# Patient Record
Sex: MALE | Race: OTHER | HISPANIC OR LATINO | URBAN - METROPOLITAN AREA
[De-identification: names, ages, dates, MRNs, and addresses within clinical notes are randomized per-mention and may not be internally consistent; named-entity substitution may affect disease eponyms.]

---

## 2017-02-21 ENCOUNTER — INPATIENT (INPATIENT)
Facility: HOSPITAL | Age: 67
LOS: 2 days | Discharge: HOME | DRG: 195 | End: 2017-02-24
Attending: INTERNAL MEDICINE | Admitting: HOSPITALIST
Payer: COMMERCIAL

## 2017-02-21 VITALS
OXYGEN SATURATION: 93 % | DIASTOLIC BLOOD PRESSURE: 79 MMHG | HEART RATE: 80 BPM | WEIGHT: 175.93 LBS | TEMPERATURE: 99 F | SYSTOLIC BLOOD PRESSURE: 160 MMHG | RESPIRATION RATE: 20 BRPM

## 2017-02-21 LAB
ALBUMIN SERPL ELPH-MCNC: 4.3 G/DL — SIGNIFICANT CHANGE UP (ref 3.3–5.2)
ALP SERPL-CCNC: 105 U/L — SIGNIFICANT CHANGE UP (ref 40–120)
ALT FLD-CCNC: 23 U/L — SIGNIFICANT CHANGE UP
ANION GAP SERPL CALC-SCNC: 12 MMOL/L — SIGNIFICANT CHANGE UP (ref 5–17)
AST SERPL-CCNC: 29 U/L — SIGNIFICANT CHANGE UP
BILIRUB SERPL-MCNC: 0.2 MG/DL — LOW (ref 0.4–2)
BUN SERPL-MCNC: 8 MG/DL — SIGNIFICANT CHANGE UP (ref 8–20)
CALCIUM SERPL-MCNC: 9.2 MG/DL — SIGNIFICANT CHANGE UP (ref 8.6–10.2)
CHLORIDE SERPL-SCNC: 95 MMOL/L — LOW (ref 98–107)
CO2 SERPL-SCNC: 29 MMOL/L — SIGNIFICANT CHANGE UP (ref 22–29)
CREAT SERPL-MCNC: 0.75 MG/DL — SIGNIFICANT CHANGE UP (ref 0.5–1.3)
GLUCOSE SERPL-MCNC: 154 MG/DL — HIGH (ref 70–115)
HCT VFR BLD CALC: 44.2 % — SIGNIFICANT CHANGE UP (ref 42–52)
HGB BLD-MCNC: 15.1 G/DL — SIGNIFICANT CHANGE UP (ref 14–18)
LYMPHOCYTES # BLD AUTO: 10 % — LOW (ref 20–55)
MCHC RBC-ENTMCNC: 30.7 PG — SIGNIFICANT CHANGE UP (ref 27–31)
MCHC RBC-ENTMCNC: 34.2 G/DL — SIGNIFICANT CHANGE UP (ref 32–36)
MCV RBC AUTO: 89.8 FL — SIGNIFICANT CHANGE UP (ref 80–94)
METAMYELOCYTES # FLD: 1 % — HIGH (ref 0–0)
MONOCYTES NFR BLD AUTO: 6 % — SIGNIFICANT CHANGE UP (ref 3–10)
NEUTROPHILS NFR BLD AUTO: 81 % — HIGH (ref 37–73)
NEUTS BAND # BLD: 1 % — SIGNIFICANT CHANGE UP (ref 0–8)
PLAT MORPH BLD: NORMAL — SIGNIFICANT CHANGE UP
PLATELET # BLD AUTO: 224 K/UL — SIGNIFICANT CHANGE UP (ref 150–400)
POTASSIUM SERPL-MCNC: 5.5 MMOL/L — HIGH (ref 3.5–5.3)
POTASSIUM SERPL-SCNC: 5.5 MMOL/L — HIGH (ref 3.5–5.3)
PROT SERPL-MCNC: 8.4 G/DL — SIGNIFICANT CHANGE UP (ref 6.6–8.7)
RBC # BLD: 4.92 M/UL — SIGNIFICANT CHANGE UP (ref 4.6–6.2)
RBC # FLD: 12.7 % — SIGNIFICANT CHANGE UP (ref 11–15.6)
RBC BLD AUTO: NORMAL — SIGNIFICANT CHANGE UP
SODIUM SERPL-SCNC: 136 MMOL/L — SIGNIFICANT CHANGE UP (ref 135–145)
VARIANT LYMPHS # BLD: 1 % — SIGNIFICANT CHANGE UP (ref 0–6)
WBC # BLD: 11.8 K/UL — HIGH (ref 4.8–10.8)
WBC # FLD AUTO: 11.8 K/UL — HIGH (ref 4.8–10.8)

## 2017-02-21 PROCEDURE — 99285 EMERGENCY DEPT VISIT HI MDM: CPT

## 2017-02-21 PROCEDURE — 71250 CT THORAX DX C-: CPT | Mod: 26

## 2017-02-21 RX ORDER — CEFTRIAXONE 500 MG/1
1 INJECTION, POWDER, FOR SOLUTION INTRAMUSCULAR; INTRAVENOUS EVERY 24 HOURS
Qty: 0 | Refills: 0 | Status: DISCONTINUED | OUTPATIENT
Start: 2017-02-21 | End: 2017-02-24

## 2017-02-21 RX ORDER — AZITHROMYCIN 500 MG/1
500 TABLET, FILM COATED ORAL ONCE
Qty: 0 | Refills: 0 | Status: COMPLETED | OUTPATIENT
Start: 2017-02-21 | End: 2017-02-21

## 2017-02-21 RX ORDER — IPRATROPIUM/ALBUTEROL SULFATE 18-103MCG
3 AEROSOL WITH ADAPTER (GRAM) INHALATION ONCE
Qty: 0 | Refills: 0 | Status: COMPLETED | OUTPATIENT
Start: 2017-02-21 | End: 2017-02-21

## 2017-02-21 RX ADMIN — Medication 125 MILLIGRAM(S): at 20:25

## 2017-02-21 RX ADMIN — Medication 3 MILLILITER(S): at 20:25

## 2017-02-21 RX ADMIN — CEFTRIAXONE 100 GRAM(S): 500 INJECTION, POWDER, FOR SOLUTION INTRAMUSCULAR; INTRAVENOUS at 20:24

## 2017-02-21 RX ADMIN — AZITHROMYCIN 500 MILLIGRAM(S): 500 TABLET, FILM COATED ORAL at 20:25

## 2017-02-21 NOTE — ED STATDOCS - PROGRESS NOTE DETAILS
PA NOTE: Pt seen by intake physician and orders/plan reviewed. PT is a 67yo male with DM on metformin presenting to ED with complaints of  PE: GEN: Awake, alert,  NAD,  EYES: PERRL CARDIAC: Reg rate and rhythm, S1,S2, RRR  RESP: No distress noted. Lungs CTA bilaterally no wheeze, ronchi, rales. ABD: soft, supple, non-tender, no guarding. . NEURO: AOx3, no focal deficits   PLAN: PA NOTE: Pt seen by intake physician and orders/plan reviewed. PT is a 67yo male with DM on metformin presenting to ED with complaints of cough and congestion x 3 days. pt reports non-productive cough   PE: GEN: Awake, alert,  NAD,  EYES: PERRL CARDIAC: Reg rate and rhythm, S1,S2, RRR  RESP: No distress noted. Lungs CTA bilaterally no wheeze, ronchi, rales. ABD: soft, supple, non-tender, no guarding. . NEURO: AOx3, no focal deficits   PLAN: PA NOTE: Pt seen by intake physician and orders/plan reviewed. PT is a 65yo male with DM on metformin presenting to ED with complaints of cough and congestion x 3 days. pt reports non-productive cough and nasal congestion. pt took Robitussin for symptoms. pt was seen at [facility] today and CXR shows PNA. Pt sent to ED for further evaluation due to low O2 sat. Pt endorses orthopnea without SOB when ambulating. pt is able to walk without trouble. Pt smokes [id number] fever, chills, rash, CP, N/V/D, abd pain. NKDA  PE: GEN: Awake, alert,  NAD, SKIN: warm, dry. consistent color with race. No cyanosis or nail clubbing.  CARDIAC: Reg rate and rhythm, S1,S2, RRR  RESP: No distress noted. Lungs decreased BS BL. + light rales heart. ABD: soft, supple, non-tender, no guarding. NEURO: AOx3, no focal deficits   PLAN:pt is a 65yo male with PNA and low O2 SAT at 93%RA. Labs pending. will re-evaluate. Pt reports improvement of symptoms. CT and labs reviewed. O2 sat 91%. Will admit to medicine. pt verbalized understanding and agreement with plan and dx. will admit.

## 2017-02-21 NOTE — ED STATDOCS - OBJECTIVE STATEMENT
65 y/o M pt w/ PMHx of DM presents to the ED c/o cough and nasal congestion x3 days. Pt's daughter states that the pt went to [facility] and was found to be hypoxic and had an X-ray showing pneumonia. Pt denies feeling short of breath. Pt's daughter notes the pt wakes up at night gasping for air. Pt denies throat pain, vomiting, fever, diarrhea, or any other complaints. NKDA. As per [facility] paper work, pt has a patchy infiltrate in his L lower lobe.

## 2017-02-21 NOTE — ED STATDOCS - MEDICAL DECISION MAKING DETAILS
67 y/o M pt w/ labs, abx, nebs treatment, steroids, and reassess. Possible admit if O2 sat does not improve.

## 2017-02-22 DIAGNOSIS — J18.9 PNEUMONIA, UNSPECIFIED ORGANISM: ICD-10-CM

## 2017-02-22 LAB
ANION GAP SERPL CALC-SCNC: 12 MMOL/L — SIGNIFICANT CHANGE UP (ref 5–17)
APPEARANCE UR: CLEAR — SIGNIFICANT CHANGE UP
BILIRUB UR-MCNC: NEGATIVE — SIGNIFICANT CHANGE UP
BUN SERPL-MCNC: 10 MG/DL — SIGNIFICANT CHANGE UP (ref 8–20)
CALCIUM SERPL-MCNC: 9.2 MG/DL — SIGNIFICANT CHANGE UP (ref 8.6–10.2)
CHLORIDE SERPL-SCNC: 97 MMOL/L — LOW (ref 98–107)
CO2 SERPL-SCNC: 24 MMOL/L — SIGNIFICANT CHANGE UP (ref 22–29)
COLOR SPEC: YELLOW — SIGNIFICANT CHANGE UP
CREAT SERPL-MCNC: 0.71 MG/DL — SIGNIFICANT CHANGE UP (ref 0.5–1.3)
DIFF PNL FLD: ABNORMAL
GLUCOSE SERPL-MCNC: 319 MG/DL — HIGH (ref 70–115)
GLUCOSE UR QL: 1000 MG/DL
HCT VFR BLD CALC: 39.1 % — LOW (ref 42–52)
HGB BLD-MCNC: 13.5 G/DL — LOW (ref 14–18)
KETONES UR-MCNC: ABNORMAL
LEUKOCYTE ESTERASE UR-ACNC: NEGATIVE — SIGNIFICANT CHANGE UP
LYMPHOCYTES # BLD AUTO: 0.8 K/UL — LOW (ref 1–4.8)
LYMPHOCYTES # BLD AUTO: 10 % — LOW (ref 20–55)
MAGNESIUM SERPL-MCNC: 2 MG/DL — SIGNIFICANT CHANGE UP (ref 1.8–2.5)
MCHC RBC-ENTMCNC: 30.5 PG — SIGNIFICANT CHANGE UP (ref 27–31)
MCHC RBC-ENTMCNC: 34.5 G/DL — SIGNIFICANT CHANGE UP (ref 32–36)
MCV RBC AUTO: 88.3 FL — SIGNIFICANT CHANGE UP (ref 80–94)
MONOCYTES NFR BLD AUTO: 1 % — LOW (ref 3–10)
NEUTROPHILS # BLD AUTO: 7.5 K/UL — SIGNIFICANT CHANGE UP (ref 1.8–8)
NEUTROPHILS NFR BLD AUTO: 87 % — HIGH (ref 37–73)
NEUTS BAND # BLD: 2 % — SIGNIFICANT CHANGE UP (ref 0–8)
NITRITE UR-MCNC: NEGATIVE — SIGNIFICANT CHANGE UP
PH UR: 7 — SIGNIFICANT CHANGE UP (ref 4.8–8)
PLAT MORPH BLD: NORMAL — SIGNIFICANT CHANGE UP
PLATELET # BLD AUTO: 215 K/UL — SIGNIFICANT CHANGE UP (ref 150–400)
POTASSIUM SERPL-MCNC: 4.5 MMOL/L — SIGNIFICANT CHANGE UP (ref 3.5–5.3)
POTASSIUM SERPL-SCNC: 4.5 MMOL/L — SIGNIFICANT CHANGE UP (ref 3.5–5.3)
PROT UR-MCNC: NEGATIVE MG/DL — SIGNIFICANT CHANGE UP
RAPID RVP RESULT: SIGNIFICANT CHANGE UP
RBC # BLD: 4.43 M/UL — LOW (ref 4.6–6.2)
RBC # FLD: 12.6 % — SIGNIFICANT CHANGE UP (ref 11–15.6)
RBC BLD AUTO: NORMAL — SIGNIFICANT CHANGE UP
SODIUM SERPL-SCNC: 133 MMOL/L — LOW (ref 135–145)
SP GR SPEC: 1.01 — SIGNIFICANT CHANGE UP (ref 1.01–1.02)
UROBILINOGEN FLD QL: NEGATIVE MG/DL — SIGNIFICANT CHANGE UP
WBC # BLD: 8.4 K/UL — SIGNIFICANT CHANGE UP (ref 4.8–10.8)
WBC # FLD AUTO: 8.4 K/UL — SIGNIFICANT CHANGE UP (ref 4.8–10.8)
WBC UR QL: SIGNIFICANT CHANGE UP

## 2017-02-22 PROCEDURE — 99222 1ST HOSP IP/OBS MODERATE 55: CPT

## 2017-02-22 RX ORDER — ACETAMINOPHEN 500 MG
650 TABLET ORAL EVERY 6 HOURS
Qty: 0 | Refills: 0 | Status: DISCONTINUED | OUTPATIENT
Start: 2017-02-22 | End: 2017-02-24

## 2017-02-22 RX ORDER — AZITHROMYCIN 500 MG/1
500 TABLET, FILM COATED ORAL EVERY 24 HOURS
Qty: 0 | Refills: 0 | Status: DISCONTINUED | OUTPATIENT
Start: 2017-02-22 | End: 2017-02-22

## 2017-02-22 RX ORDER — SODIUM CHLORIDE 9 MG/ML
1000 INJECTION INTRAMUSCULAR; INTRAVENOUS; SUBCUTANEOUS
Qty: 0 | Refills: 0 | Status: DISCONTINUED | OUTPATIENT
Start: 2017-02-22 | End: 2017-02-22

## 2017-02-22 RX ORDER — ENOXAPARIN SODIUM 100 MG/ML
40 INJECTION SUBCUTANEOUS DAILY
Qty: 0 | Refills: 0 | Status: DISCONTINUED | OUTPATIENT
Start: 2017-02-22 | End: 2017-02-24

## 2017-02-22 RX ORDER — DEXTROSE 50 % IN WATER 50 %
1 SYRINGE (ML) INTRAVENOUS ONCE
Qty: 0 | Refills: 0 | Status: DISCONTINUED | OUTPATIENT
Start: 2017-02-22 | End: 2017-02-24

## 2017-02-22 RX ORDER — INFLUENZA VIRUS VACCINE 15; 15; 15; 15 UG/.5ML; UG/.5ML; UG/.5ML; UG/.5ML
0.5 SUSPENSION INTRAMUSCULAR ONCE
Qty: 0 | Refills: 0 | Status: COMPLETED | OUTPATIENT
Start: 2017-02-22 | End: 2017-02-22

## 2017-02-22 RX ORDER — IPRATROPIUM/ALBUTEROL SULFATE 18-103MCG
3 AEROSOL WITH ADAPTER (GRAM) INHALATION EVERY 6 HOURS
Qty: 0 | Refills: 0 | Status: DISCONTINUED | OUTPATIENT
Start: 2017-02-22 | End: 2017-02-24

## 2017-02-22 RX ORDER — DEXTROSE 50 % IN WATER 50 %
12.5 SYRINGE (ML) INTRAVENOUS ONCE
Qty: 0 | Refills: 0 | Status: DISCONTINUED | OUTPATIENT
Start: 2017-02-22 | End: 2017-02-24

## 2017-02-22 RX ORDER — SODIUM CHLORIDE 9 MG/ML
1000 INJECTION, SOLUTION INTRAVENOUS
Qty: 0 | Refills: 0 | Status: DISCONTINUED | OUTPATIENT
Start: 2017-02-22 | End: 2017-02-24

## 2017-02-22 RX ORDER — METFORMIN HYDROCHLORIDE 850 MG/1
1 TABLET ORAL
Qty: 0 | Refills: 0 | COMMUNITY

## 2017-02-22 RX ORDER — LACTOBACILLUS ACIDOPHILUS 100MM CELL
1 CAPSULE ORAL
Qty: 0 | Refills: 0 | Status: DISCONTINUED | OUTPATIENT
Start: 2017-02-22 | End: 2017-02-24

## 2017-02-22 RX ORDER — DEXTROSE 50 % IN WATER 50 %
25 SYRINGE (ML) INTRAVENOUS ONCE
Qty: 0 | Refills: 0 | Status: DISCONTINUED | OUTPATIENT
Start: 2017-02-22 | End: 2017-02-24

## 2017-02-22 RX ORDER — INSULIN LISPRO 100/ML
VIAL (ML) SUBCUTANEOUS
Qty: 0 | Refills: 0 | Status: DISCONTINUED | OUTPATIENT
Start: 2017-02-22 | End: 2017-02-24

## 2017-02-22 RX ORDER — NICOTINE POLACRILEX 2 MG
1 GUM BUCCAL DAILY
Qty: 0 | Refills: 0 | Status: DISCONTINUED | OUTPATIENT
Start: 2017-02-22 | End: 2017-02-24

## 2017-02-22 RX ORDER — GLUCAGON INJECTION, SOLUTION 0.5 MG/.1ML
1 INJECTION, SOLUTION SUBCUTANEOUS ONCE
Qty: 0 | Refills: 0 | Status: DISCONTINUED | OUTPATIENT
Start: 2017-02-22 | End: 2017-02-24

## 2017-02-22 RX ORDER — SODIUM CHLORIDE 9 MG/ML
1000 INJECTION INTRAMUSCULAR; INTRAVENOUS; SUBCUTANEOUS
Qty: 0 | Refills: 0 | Status: DISCONTINUED | OUTPATIENT
Start: 2017-02-22 | End: 2017-02-23

## 2017-02-22 RX ORDER — AZITHROMYCIN 500 MG/1
250 TABLET, FILM COATED ORAL DAILY
Qty: 0 | Refills: 0 | Status: DISCONTINUED | OUTPATIENT
Start: 2017-02-22 | End: 2017-02-24

## 2017-02-22 RX ADMIN — Medication 1 TABLET(S): at 16:57

## 2017-02-22 RX ADMIN — CEFTRIAXONE 100 GRAM(S): 500 INJECTION, POWDER, FOR SOLUTION INTRAMUSCULAR; INTRAVENOUS at 21:23

## 2017-02-22 RX ADMIN — Medication 1 PATCH: at 14:15

## 2017-02-22 RX ADMIN — Medication: at 11:31

## 2017-02-22 RX ADMIN — Medication 1 TABLET(S): at 07:29

## 2017-02-22 RX ADMIN — Medication 2: at 21:23

## 2017-02-22 RX ADMIN — SODIUM CHLORIDE 125 MILLILITER(S): 9 INJECTION INTRAMUSCULAR; INTRAVENOUS; SUBCUTANEOUS at 00:45

## 2017-02-22 RX ADMIN — Medication: at 07:32

## 2017-02-22 NOTE — H&P ADULT. - ASSESSMENT
66 years old male with PMH of DM comes with cough and nasal congestion for 2 days. Cough is productive with white mucoid sputum. Denies any fever, chest pain, palpitations, shortness of breath, throat pain or sneezing. He went to Urgent Care where he was diagnosed with Pneumonia and his oxygen saturation was 93% on room air so he was sent for further evaluation and management.     1) Community Acquired Pneumonia  - Blood Cultures and Sputum Culture  - Legionella Antigen  - RVP  - Rocephin and Zithromax  2) Smoking  - Counselling provided  - Nicotine patch  3) DM  - Accu checks and RISS  DVT Prophylaxis -- Lovenox 40 mg

## 2017-02-22 NOTE — H&P ADULT. - FAMILY HISTORY
Father  Still living? Unknown  Family history of diabetes mellitus, Age at diagnosis: Age Unknown     Mother  Still living? Unknown  Family history of diabetes mellitus, Age at diagnosis: Age Unknown  Family history of coronary artery disease, Age at diagnosis: Age Unknown

## 2017-02-22 NOTE — PROGRESS NOTE ADULT - ASSESSMENT
66 years old male with PMH of DM comes with cough and nasal congestion for 2 days. Cough is productive with white mucoid sputum. He went to Urgent Care where he was diagnosed with Pneumonia and his oxygen saturation was 93% on room air so he was sent for further evaluation and management. CT scan showed tree in bud nodular opacities represent atypical pneumonia     A/P    > Community Acquired Pneumonia   RVP negative    cont. Rocephin and Zithromax   F/U Blood Cultures and Sputum Culture   Legionella Antigen   Robitussin prn    Duoneb   IVF    >Nicotine abuse   Counselled   Nicotine patch to prevent withdrawal     > DM   Accu checks and RISS    >Hyperkalemia - resolved     > DVT Prophylaxis -- Lovenox 40 mg

## 2017-02-22 NOTE — PROGRESS NOTE ADULT - SUBJECTIVE AND OBJECTIVE BOX
Internal Medicine Hospitalist - Dr. Chago MCNALLY    [id number]    66y      Male    Patient is a 66y old  Male who presents with a chief complaint of Cough  Nasal Congestion (22 Feb 2017 00:38)    HPI:  66 years old male with PMH of DM comes with cough and nasal congestion for 2 days. Cough is productive with white mucoid sputum. Denies any fever, chest pain, palpitations, shortness of breath, throat pain or sneezing. He went to Urgent Care where he was diagnosed with Pneumonia and his oxygen saturation was 93% on room air so he was sent for further evaluation and management.   In ER, he had CT Chest which showed - Tree-in-bud nodular opacities in the left upper and left lower lobe may represent distal airway impaction versus atypical pneumonia. He was given Rocephin, Zithromax, DuoNeb and Solumed in ER.   He is active smoker for 40 years and smokes cigarettes 1/2 PPD. (22 Feb 2017 00:38)      INTERVAL HPI/ OVERNIGHT EVENTS: Patient is seen and examined, pt report cough, SOB, requiring additional oxygen to maintain saturation     REVIEW OF SYSTEMS:    Denied fever, chills, abd. pain, nausea, vomiting, chest pain, headache, dizziness    PHYSICAL EXAM:    Vital Signs Last 24 Hrs  T(C): 36.7, Max: 37 (02-21 @ 18:06)  T(F): 98, Max: 98.6 (02-21 @ 18:06)  HR: 84 (80 - 89)  BP: 152/68 (147/77 - 160/79)  BP(mean): --  RR: 18 (18 - 20)  SpO2: 96% (91% - 96%)    GENERAL: NAD  HEENT: EOMI  Neck: supple  CHEST/LUNG: diminished breath sounds, scattered wheezes   HEART: S1S2+ audible  ABDOMEN: Soft, Nontender, Nondistended; Bowel sounds present  EXTREMITIES:  no edema  CNS: AAO X 3  Psychiatry: normal mood    LABS:                        13.5   8.4   )-----------( 215      ( 22 Feb 2017 05:54 )             39.1     22 Feb 2017 05:54    133    |  97     |  10.0   ----------------------------<  319    4.5     |  24.0   |  0.71     Ca    9.2        22 Feb 2017 05:54  Mg     2.0       22 Feb 2017 05:54    TPro  8.4    /  Alb  4.3    /  TBili  0.2    /  DBili  x      /  AST  29     /  ALT  23     /  AlkPhos  105    21 Feb 2017 20:27            MEDICATIONS  (STANDING):  cefTRIAXone   IVPB 1Gram(s) IV Intermittent every 24 hours  sodium chloride 0.9%. 1000milliLiter(s) IV Continuous <Continuous>  azithromycin  IVPB 500milliGRAM(s) IV Intermittent every 24 hours  enoxaparin Injectable 40milliGRAM(s) SubCutaneous daily  insulin lispro (HumaLOG) corrective regimen sliding scale  SubCutaneous Before meals and at bedtime  dextrose 5%. 1000milliLiter(s) IV Continuous <Continuous>  dextrose 50% Injectable 12.5Gram(s) IV Push once  dextrose 50% Injectable 25Gram(s) IV Push once  dextrose 50% Injectable 25Gram(s) IV Push once  nicotine -  14 mG/24Hr(s) Patch 1patch Transdermal daily  lactobacillus acidophilus 1Tablet(s) Oral two times a day with meals    MEDICATIONS  (PRN):  dextrose Gel 1Dose(s) Oral once PRN Blood Glucose LESS THAN 70 milliGRAM(s)/deciLiter  glucagon  Injectable 1milliGRAM(s) IntraMuscular once PRN Glucose <70 milliGRAM(s)/deciLiter  acetaminophen   Tablet 650milliGRAM(s) Oral every 6 hours PRN For Temp greater than 38 C (100.4 F)  acetaminophen   Tablet. 650milliGRAM(s) Oral every 6 hours PRN Pain  ALBUTerol/ipratropium for Nebulization 3milliLiter(s) Nebulizer every 6 hours PRN Shortness of Breath and/or Wheezing      RADIOLOGY & ADDITIONAL TEST  EXAM:  CT CHEST                        PROCEDURE DATE:  02/21/2017    IMPRESSION:     Tree-in-bud nodular opacities in the left upper and left lower lobe may   represent distal airway impaction versus atypical pneumonia.

## 2017-02-23 LAB
ANION GAP SERPL CALC-SCNC: 8 MMOL/L — SIGNIFICANT CHANGE UP (ref 5–17)
BUN SERPL-MCNC: 13 MG/DL — SIGNIFICANT CHANGE UP (ref 8–20)
CALCIUM SERPL-MCNC: 8.7 MG/DL — SIGNIFICANT CHANGE UP (ref 8.6–10.2)
CHLORIDE SERPL-SCNC: 104 MMOL/L — SIGNIFICANT CHANGE UP (ref 98–107)
CO2 SERPL-SCNC: 28 MMOL/L — SIGNIFICANT CHANGE UP (ref 22–29)
CREAT SERPL-MCNC: 0.75 MG/DL — SIGNIFICANT CHANGE UP (ref 0.5–1.3)
GLUCOSE SERPL-MCNC: 126 MG/DL — HIGH (ref 70–115)
HCT VFR BLD CALC: 39.1 % — LOW (ref 42–52)
HGB BLD-MCNC: 13.1 G/DL — LOW (ref 14–18)
LEGIONELLA AB SER-ACNC: NEGATIVE — SIGNIFICANT CHANGE UP
M PNEUMO IGM SER-ACNC: 32 UNITS/ML — SIGNIFICANT CHANGE UP
MCHC RBC-ENTMCNC: 30.3 PG — SIGNIFICANT CHANGE UP (ref 27–31)
MCHC RBC-ENTMCNC: 33.5 G/DL — SIGNIFICANT CHANGE UP (ref 32–36)
MCV RBC AUTO: 90.5 FL — SIGNIFICANT CHANGE UP (ref 80–94)
MYCOPLASMA AG SPEC QL: NEGATIVE — SIGNIFICANT CHANGE UP
PLATELET # BLD AUTO: 204 K/UL — SIGNIFICANT CHANGE UP (ref 150–400)
POTASSIUM SERPL-MCNC: 4.5 MMOL/L — SIGNIFICANT CHANGE UP (ref 3.5–5.3)
POTASSIUM SERPL-SCNC: 4.5 MMOL/L — SIGNIFICANT CHANGE UP (ref 3.5–5.3)
RBC # BLD: 4.32 M/UL — LOW (ref 4.6–6.2)
RBC # FLD: 12.9 % — SIGNIFICANT CHANGE UP (ref 11–15.6)
SODIUM SERPL-SCNC: 140 MMOL/L — SIGNIFICANT CHANGE UP (ref 135–145)
WBC # BLD: 11.6 K/UL — HIGH (ref 4.8–10.8)
WBC # FLD AUTO: 11.6 K/UL — HIGH (ref 4.8–10.8)

## 2017-02-23 PROCEDURE — 99233 SBSQ HOSP IP/OBS HIGH 50: CPT

## 2017-02-23 RX ADMIN — Medication 1: at 11:11

## 2017-02-23 RX ADMIN — Medication 1 PATCH: at 11:12

## 2017-02-23 RX ADMIN — SODIUM CHLORIDE 75 MILLILITER(S): 9 INJECTION INTRAMUSCULAR; INTRAVENOUS; SUBCUTANEOUS at 05:35

## 2017-02-23 RX ADMIN — Medication 1 TABLET(S): at 08:02

## 2017-02-23 RX ADMIN — Medication 1 TABLET(S): at 16:10

## 2017-02-23 RX ADMIN — Medication 1 PATCH: at 11:11

## 2017-02-23 RX ADMIN — AZITHROMYCIN 250 MILLIGRAM(S): 500 TABLET, FILM COATED ORAL at 11:12

## 2017-02-23 RX ADMIN — CEFTRIAXONE 100 GRAM(S): 500 INJECTION, POWDER, FOR SOLUTION INTRAMUSCULAR; INTRAVENOUS at 16:15

## 2017-02-23 RX ADMIN — ENOXAPARIN SODIUM 40 MILLIGRAM(S): 100 INJECTION SUBCUTANEOUS at 11:12

## 2017-02-23 RX ADMIN — Medication 1: at 21:36

## 2017-02-23 NOTE — PROGRESS NOTE ADULT - ASSESSMENT
66 years old male with PMH of DM comes with cough and nasal congestion for 2 days. Cough is productive with white mucoid sputum. He went to Urgent Care where he was diagnosed with Pneumonia and his oxygen saturation was 93% on room air so he was sent for further evaluation and management. CT scan showed tree in bud nodular opacities represent atypical pneumonia     A/P    > Community Acquired Pneumonia   RVP negative    cont. Rocephin and Zithromax   F/U Blood Cultures and Sputum Culture   Legionella Antigen   Robitussin prn    Duoneb  d/c'd  IVF    >Nicotine abuse   Counselled   Nicotine patch to prevent withdrawal     > DM   Accu checks and RISS    >Hyperkalemia - resolved     > DVT Prophylaxis -- Lovenox    daughter present bedside, updated

## 2017-02-23 NOTE — PROGRESS NOTE ADULT - SUBJECTIVE AND OBJECTIVE BOX
Internal Medicine Hospitalist - Dr. Chago MCNALLY    [id number]    66y      Male    Patient is a 66y old  Male who presents with a chief complaint of Cough  Nasal Congestion (2017 00:38)    HPI:  66 years old male with PMH of DM comes with cough and nasal congestion for 2 days. Cough is productive with white mucoid sputum. Denies any fever, chest pain, palpitations, shortness of breath, throat pain or sneezing. He went to Urgent Care where he was diagnosed with Pneumonia and his oxygen saturation was 93% on room air so he was sent for further evaluation and management.   In ER, he had CT Chest which showed - Tree-in-bud nodular opacities in the left upper and left lower lobe may represent distal airway impaction versus atypical pneumonia. He was given Rocephin, Zithromax, DuoNeb and Solumed in ER.   He is active smoker for 40 years and smokes cigarettes 1/2 PPD. (2017 00:38)      INTERVAL HPI/ OVERNIGHT EVENTS: Patient is seen and examined, c/o mild cough and SOB, still requiring additional oxygen.     REVIEW OF SYSTEMS:    Denied fever, chills, abd. pain, nausea, vomiting, chest pain, headache, dizziness    PHYSICAL EXAM:    Vital Signs Last 24 Hrs  T(C): 36.3, Max: 36.9 ( @ 15:24)  T(F): 97.4, Max: 98.5 (- @ 15:24)  HR: 78 (78 - 102)  BP: 139/78 (133/66 - 139/78)  BP(mean): --  RR: 19 (17 - 19)  SpO2: 97% (94% - 97%)    GENERAL: NAD  HEENT: EOMI  Neck: supple  CHEST/LUNG: improving air entry  HEART: S1S2+ audible  ABDOMEN: Soft, Nontender, Nondistended; Bowel sounds present  EXTREMITIES:  no edema  CNS: AAO X 3  Psychiatry: normal mood    LABS:                        13.1   11.6  )-----------( 204      ( 2017 07:10 )             39.1     2017 07:10    140    |  104    |  13.0   ----------------------------<  126    4.5     |  28.0   |  0.75     Ca    8.7        2017 07:10  Mg     2.0       2017 05:54    TPro  8.4    /  Alb  4.3    /  TBili  0.2    /  DBili  x      /  AST  29     /  ALT  23     /  AlkPhos  105    2017 20:27      Urinalysis Basic - ( 2017 11:49 )    Color: Yellow / Appearance: Clear / S.010 / pH: x  Gluc: x / Ketone: Trace  / Bili: Negative / Urobili: Negative mg/dL   Blood: x / Protein: Negative mg/dL / Nitrite: Negative   Leuk Esterase: Negative / RBC: x / WBC 3-5   Sq Epi: x / Non Sq Epi: x / Bacteria: x          MEDICATIONS  (STANDING):  cefTRIAXone   IVPB 1Gram(s) IV Intermittent every 24 hours  enoxaparin Injectable 40milliGRAM(s) SubCutaneous daily  insulin lispro (HumaLOG) corrective regimen sliding scale  SubCutaneous Before meals and at bedtime  dextrose 5%. 1000milliLiter(s) IV Continuous <Continuous>  dextrose 50% Injectable 12.5Gram(s) IV Push once  dextrose 50% Injectable 25Gram(s) IV Push once  dextrose 50% Injectable 25Gram(s) IV Push once  nicotine -  14 mG/24Hr(s) Patch 1patch Transdermal daily  lactobacillus acidophilus 1Tablet(s) Oral two times a day with meals  sodium chloride 0.9%. 1000milliLiter(s) IV Continuous <Continuous>  azithromycin   Tablet 250milliGRAM(s) Oral daily    MEDICATIONS  (PRN):  dextrose Gel 1Dose(s) Oral once PRN Blood Glucose LESS THAN 70 milliGRAM(s)/deciLiter  glucagon  Injectable 1milliGRAM(s) IntraMuscular once PRN Glucose <70 milliGRAM(s)/deciLiter  acetaminophen   Tablet 650milliGRAM(s) Oral every 6 hours PRN For Temp greater than 38 C (100.4 F)  acetaminophen   Tablet. 650milliGRAM(s) Oral every 6 hours PRN Pain  ALBUTerol/ipratropium for Nebulization 3milliLiter(s) Nebulizer every 6 hours PRN Shortness of Breath and/or Wheezing  guaiFENesin    Syrup 100milliGRAM(s) Oral every 6 hours PRN Cough      RADIOLOGY & ADDITIONAL TEST

## 2017-02-24 VITALS
OXYGEN SATURATION: 93 % | DIASTOLIC BLOOD PRESSURE: 82 MMHG | RESPIRATION RATE: 18 BRPM | SYSTOLIC BLOOD PRESSURE: 130 MMHG | HEART RATE: 78 BPM | TEMPERATURE: 98 F

## 2017-02-24 LAB
C PNEUM IGM TITR SER: SIGNIFICANT CHANGE UP TITER
CHLAMYDIA IGG SER-ACNC: ABNORMAL TITER
CHLAMYDIA PNEUMONIAE IGA: SIGNIFICANT CHANGE UP TITER
LEGIONELLA AG UR QL: NEGATIVE — SIGNIFICANT CHANGE UP

## 2017-02-24 PROCEDURE — 81001 URINALYSIS AUTO W/SCOPE: CPT

## 2017-02-24 PROCEDURE — 96374 THER/PROPH/DIAG INJ IV PUSH: CPT

## 2017-02-24 PROCEDURE — 86713 LEGIONELLA ANTIBODY: CPT

## 2017-02-24 PROCEDURE — 80048 BASIC METABOLIC PNL TOTAL CA: CPT

## 2017-02-24 PROCEDURE — 87486 CHLMYD PNEUM DNA AMP PROBE: CPT

## 2017-02-24 PROCEDURE — 87798 DETECT AGENT NOS DNA AMP: CPT

## 2017-02-24 PROCEDURE — 87040 BLOOD CULTURE FOR BACTERIA: CPT

## 2017-02-24 PROCEDURE — 87449 NOS EACH ORGANISM AG IA: CPT

## 2017-02-24 PROCEDURE — 80053 COMPREHEN METABOLIC PANEL: CPT

## 2017-02-24 PROCEDURE — 36415 COLL VENOUS BLD VENIPUNCTURE: CPT

## 2017-02-24 PROCEDURE — 99238 HOSP IP/OBS DSCHRG MGMT 30/<: CPT

## 2017-02-24 PROCEDURE — 87633 RESP VIRUS 12-25 TARGETS: CPT

## 2017-02-24 PROCEDURE — 85027 COMPLETE CBC AUTOMATED: CPT

## 2017-02-24 PROCEDURE — 71250 CT THORAX DX C-: CPT

## 2017-02-24 PROCEDURE — 86631 CHLAMYDIA ANTIBODY: CPT

## 2017-02-24 PROCEDURE — 87581 M.PNEUMON DNA AMP PROBE: CPT

## 2017-02-24 PROCEDURE — 83735 ASSAY OF MAGNESIUM: CPT

## 2017-02-24 PROCEDURE — 96375 TX/PRO/DX INJ NEW DRUG ADDON: CPT

## 2017-02-24 PROCEDURE — 99285 EMERGENCY DEPT VISIT HI MDM: CPT | Mod: 25

## 2017-02-24 PROCEDURE — 86738 MYCOPLASMA ANTIBODY: CPT

## 2017-02-24 PROCEDURE — T1013: CPT

## 2017-02-24 PROCEDURE — 94640 AIRWAY INHALATION TREATMENT: CPT

## 2017-02-24 RX ORDER — CEFOXITIN 1 G/1
1 INJECTION, POWDER, FOR SOLUTION INTRAVENOUS
Qty: 8 | Refills: 0 | OUTPATIENT
Start: 2017-02-24 | End: 2017-02-28

## 2017-02-24 RX ORDER — AZITHROMYCIN 500 MG/1
1 TABLET, FILM COATED ORAL
Qty: 2 | Refills: 0 | OUTPATIENT
Start: 2017-02-24 | End: 2017-02-26

## 2017-02-24 RX ADMIN — Medication 1 PATCH: at 10:49

## 2017-02-24 RX ADMIN — Medication 1 TABLET(S): at 10:20

## 2017-02-24 RX ADMIN — AZITHROMYCIN 250 MILLIGRAM(S): 500 TABLET, FILM COATED ORAL at 10:19

## 2017-02-24 RX ADMIN — Medication 1 PATCH: at 10:20

## 2017-02-24 RX ADMIN — Medication: at 10:45

## 2017-02-24 NOTE — DISCHARGE NOTE ADULT - CARE PROVIDER_API CALL
Dionisio Stevens), Internal Medicine  [address] [place] [address]  Phone: [phone]  Fax: [phone]

## 2017-02-24 NOTE — DISCHARGE NOTE ADULT - HOSPITAL COURSE
66 years old male with PMH of DM comes with cough and nasal congestion for 2 days. Cough is productive with white mucoid sputum. Denies any fever, chest pain, palpitations, shortness of breath, throat pain or sneezing. He went to Urgent Care where he was diagnosed with Pneumonia and his oxygen saturation was 93% on room air so he was sent for further evaluation and management. In ER, he had CT Chest which showed - Tree-in-bud nodular opacities in the left upper and left lower lobe may represent distal airway impaction versus atypical pneumonia, started on Rocephin, azithromycin. RVP and blood c/s negative so far. Legionella and mycoplasma negative. Pt is h/o Smoking, Counselled about smoking cessation, seems positive to quit smoking.    Pt is feeling better, saturating well on room air, ambulating, denied fever, chills, SOB, chest pain.    ICU Vital Signs Last 24 Hrs  T(C): 36.4, Max: 36.6 (02-23 @ 15:21)  T(F): 97.5, Max: 97.9 (02-23 @ 15:21)  HR: 73 (73 - 78)  BP: 139/78 (139/78 - 149/77)  BP(mean): --  ABP: --  ABP(mean): --  RR: 18 (18 - 19)  SpO2: 95% (95% - 97%)    PHYSICAL EXAM:    GENERAL: NAD  HEAD:  Atraumatic, Normocephalic  NERVOUS SYSTEM:  Alert & Oriented X3, Good concentration; Motor Strength 5/5 B/L upper and lower extremities; DTRs 2+ intact and symmetric  CHEST/LUNG: positive air entry   HEART: Regular rate and rhythm; No murmurs, rubs, or gallops  ABDOMEN: Soft, Nontender, Nondistended; Bowel sounds present  EXTREMITIES:  2+ Peripheral Pulses, No clubbing, cyanosis, or edema

## 2017-02-24 NOTE — DISCHARGE NOTE ADULT - MEDICATION SUMMARY - MEDICATIONS TO TAKE
I will START or STAY ON the medications listed below when I get home from the hospital:    Pt may return to work once he is clear by PCP  -- Indication: For return to work     metFORMIN 500 mg oral tablet  -- 1 tab(s) by mouth once a day  -- Indication: For DM (diabetes mellitus)    Ceftin 500 mg oral tablet  -- 1 tab(s) by mouth 2 times a day  -- Finish all this medication unless otherwise directed by prescriber.  Medication should be taken with plenty of water.  Take with food or milk.    -- Indication: For Pneumonia    azithromycin 250 mg oral tablet  -- 1 tab(s) by mouth once a day  -- Indication: For Pneumonia

## 2017-02-24 NOTE — DISCHARGE NOTE ADULT - PATIENT PORTAL LINK FT
“You can access the Follow[employer] Patient Portal, offered by [facility], by registering with the following website: [email or web address]”

## 2017-02-24 NOTE — DISCHARGE NOTE ADULT - CARE PLAN
Principal Discharge DX:	Pneumonia  Goal:	.  Instructions for follow-up, activity and diet:	cont. Abx as prescribed  Secondary Diagnosis:	DM (diabetes mellitus)  Instructions for follow-up, activity and diet:	cont. home medication  Secondary Diagnosis:	Nicotine abuse  Instructions for follow-up, activity and diet:	quit smoking , need outpatient f/u with pulmonary

## 2017-02-27 LAB
CULTURE RESULTS: SIGNIFICANT CHANGE UP
CULTURE RESULTS: SIGNIFICANT CHANGE UP
SPECIMEN SOURCE: SIGNIFICANT CHANGE UP
SPECIMEN SOURCE: SIGNIFICANT CHANGE UP

## 2017-03-24 NOTE — PATIENT PROFILE ADULT. - NS PRO MODE OF ARRIVAL
ER Documentation


Chief Complaint


Date/Time


DATE: 3/24/17 


TIME: 20:18


Chief Complaint


right eye pain w/ redness x 2 days





HPI


42-year-old male with no significant past medical history presents the ED 

complaining of right eye pain and redness that started 2 days ago.  States that 

it is slightly itchy.  States that he has been taking Motrin without relief of 

his pain.  States that he has been rubbing his eyes.  Denies any cough, 

rhinorrhea, fever, chest pain, shortness of breath, wheezing, abdominal pain, 

nausea, vomiting, diarrhea.  Denies any blurred vision, photophobia, headache, 

weakness, photophobia, vision loss.  Denies any eye trauma.  Denies any foreign 

body sensation or getting anything in his eyes.





ROS


All systems reviewed and are negative except as per history of present illness.





Medications


Home Meds


Active Scripts


Acetaminophen* (Tylophen*) 500 Mg Capsule, 1 CAP PO Q6H Y for PAIN AND OR 

ELEVATED TEMP, #20 CAP


   Prov:AUGUSTIN BISHOP PA-C         3/24/17


Naphazoline Hcl/Phenir Mal (Naphcon-A Eye Drops) 15 Ml Drops, 15 ML OP BID, #1 

BOTTLE


   Prov:AUGUSTIN BISHOP PA-C         3/24/17


Polymyxin B Sulfate-TMP* (Polymyxin B-TMP Eye Drops*) 10 Ml Drops, 1 DROP RIGHT 

EYE QID for 7 Days, EA


   Prov:AUGUSTIN BISHOP PA-C         3/24/17





Allergies


Allergies:  


Coded Allergies:  


     No Known Allergy (Unverified , 3/24/17)





PMhx/Soc


Medical and Surgical Hx:  pt denies Medical Hx, pt denies Surgical Hx


History of Surgery:  No


Anesthesia Reaction:  No


Hx Neurological Disorder:  No


Hx Respiratory Disorders:  No


Hx Cardiac Disorders:  No


Hx Psychiatric Problems:  No


Hx Miscellaneous Medical Probl:  No (DENIES MEDICAL AND SURGICAL HX.)


Hx Alcohol Use:  No


Hx Substance Use:  No


Hx Tobacco Use:  No


Smoking Status:  Never smoker





Physical Exam


Vitals





Vital Signs








  Date Time  Temp Pulse Resp B/P Pulse Ox O2 Delivery O2 Flow Rate FiO2


 


3/24/17 18:47 97.8 87 20 139/79 100   








Physical Exam


Const: Non-ill-appearing, well-nourished. In no acute distress.


Head: Atraumatic, normocephalic 


Eyes: Right injected conjunctivae. Left conjunctiva without injection. No 

purulent discharge. PERRLA. EOMI 


ENT: Normal external ear. Ear canal without erythema. Tympanic membrane pearly 

gray without effusion or bulging. Nasal canal clear with normal turbinates. 

Moist oropharynx without tonsillar exudates. Non-erythematous pharynx. Uvula 

midline. No drooling.  No trismus. 


Neck: No cervical midline tenderness.  Full range of motion. No meningismus. No 

cervical lymphadenopathy. No JVD.


Resp: Clear to auscultation bilaterally. No wheezing, rhonchi, rales, or 

crackles. No accessory muscle use. No retractions.


Cardio: Regular rate and rhythm.  No murmurs, rubs or gallops.


Abd: Soft, non tender, non distended. Normal bowel sounds.  No palpable masses.

  No rebound tenderness.  No guarding.  Negative McBurney's Point. Negative 

Kern's Sign.


Skin: Normal skin turgor. No petechiae or rashes


Back: No midline tenderness. No CVA tenderness.


Ext: No cyanosis, or edema. Distal pulses intact bilaterally.


Neur: Awake and alert. Normal gait. Normal coordination. Cranial Nerves II- VII 

intact. Normal finger to nose. Muscle strength 5/5. Sensation intact.


Psych: Normal Mood and Affect


Results 24 hrs





 Current Medications








 Medications


  (Trade)  Dose


 Ordered  Sig/Mary


 Route


 PRN Reason  Start Time


 Stop Time Status Last Admin


Dose Admin


 


 Tetracaine HCl


  (Tetracaine 0.5%


 Steri-Unit Sol)  1 drop  ONCE  ONCE


 RIGHT EYE


   3/24/17 19:30


 3/24/17 19:31 DC  


 


 


 Fluorescein Sodium


  (Fluor-I-Strip)  1 strip  ONCE  ONCE


 RIGHT EYE


   3/24/17 19:30


 3/24/17 19:31 DC  


 











Procedures/MDM


This is a 42-year-old male with no significant past medical history presents 

the ED complaining of right eye pain and redness that started 2 days ago.  

Patient is afebrile nontoxic appearing.  Patient has normal vital signs.  A 

visual acuity, fluorescein stain with Woods lamp, Joseph-Pen pressure reading was 

performed here in the ED to further evaluate patient.





Eye Exam w/ Wood's lamp:


 Visual Acuity:    Left 20/25 right 20/25 bilateral 20/20


 Visual Fields:      Intact in all four quadrants bilaterally


 Lac ducts/glands:      No swelling


 Lids w/ evertion:      Normal, no foreign body


 Conj/Corn:               Clear, negative Fluorescein/Kyra's


 Anterior Chamber:    Clear


 Tonopen readings:      [Left 20 mmHg right 18 mmHg


 Retina exam:            No obvious abnormality





Patient's ocular symptoms have stabilized while they have been evaluated in the 

department and are appropriate for outpatient work up.





Patient's symptoms could likely be due to conjunctivitis however patient was 

strictly instructed to follow-up with an ophthalmologist within 24 hours.  Low 

suspicion for ruptured globe, retinal detachment, periorbital cellulitis, acute 

angle closure glaucoma, deep space infection, iritis, traumatic hyphema, 

conjunctivitis, subconjunctival hemorrhage, corneal abrasion,  corneal ulcer, 

pterygium, hypopyon, blepharitis, hordeolum, chalazion, or other emergent 

conditions.  This case was discussed with my supervising physician, Dr. Hines 

agreed with the management and discharge plan.





Discharge medications: Tylenol, Naphcon eyedrops, Polytrim


Follow up with primary care physician in 1-2 days. Instructed patient to return 

to the ED sooner for any worsening symptoms. Patient's questions were answered. 

Patient understood and agreed with discharge plan. Patient discharged stable.





Departure


Diagnosis:  


 Primary Impression:  


 Pain, eye, right


Condition:  Stable


Patient Instructions:  Understanding Red Eye: Causes


Referrals:  


COMMUNITY CLINICS


YOU HAVE RECEIVED A MEDICAL SCREENING EXAM AND THE RESULTS INDICATE THAT YOU DO 

NOT HAVE A CONDITION THAT REQUIRES URGENT TREATMENT IN THE EMERGENCY DEPARTMENT.





FURTHER EVALUATION AND TREATMENT OF YOUR CONDITION CAN WAIT UNTIL YOU ARE SEEN 

IN YOUR DOCTORS OFFICE WITHIN THE NEXT 1-2 DAYS. IT IS YOUR RESPONSIBILITY TO 

MAKE AN APPOINTMENT FOR FOLOW-UP CARE.





IF YOU HAVE A PRIMARY DOCTOR


--you should call your primary doctor and schedule an appointment





IF YOU DO NOT HAVE A PRIMARY DOCTOR YOU CAN CALL OUR PHYSICIAN REFERRAL HOTLINE 

AT


 [phone] 





IF YOU CAN NOT AFFORD TO SEE A PHYSICIAN YOU CAN CHOSE FROM THE FOLLOWING 

[facility] CLINICS





[facility] [phone] [address]. [facility] [phone] [address]. [facility] [phone] [address] [facility] [phone] [address]. [facility] [phone] [address]. [facility] [phone] [address] [facility]. [facility]


YOU HAVE RECEIVED A MEDICAL SCREENING EXAM AND THE RESULTS INDICATE THAT YOU DO 

NOT HAVE A CONDITION THAT REQUIRES URGENT TREATMENT IN THE EMERGENCY DEPARTMENT.





FURTHER EVALUATION AND TREATMENT OF YOUR CONDITION CAN WAIT UNTIL YOU ARE SEEN 

IN YOUR DOCTORS OFFICE WITHIN THE NEXT 1-2 DAYS. IT IS YOUR RESPONSIBILITY TO 

MAKE AN APPOINTMENT FOR FOLOW-UP CARE.





IF YOU HAVE A PRIMARY DOCTOR


--you should call your primary doctor and schedule and appointment





IF YOU DO NOT HAVE A PRIMARY DOCTOR YOU CAN CALL OUR PHYSICIAN REFERRAL HOTLINE 

AT [phone].





IF YOU CAN NOT AFFORD TO SEE A PHYSICIAN YOU CAN CHOSE FROM THE FOLLOWING 

[facility] INSTITUTIONS:





[facility]


[address] [place] [address]





[facility]


[address] [place], [place] [address]


[address] [place] [address]





[facility] URGENT CARE/[facility]


Hours: Mon - Fri


9:00 AM - 5:00 PM





Additional Instructions:  


Seguimiento con Oftalmologa dentro de 24 horas. 





Regrese a estas instalaciones si no se mejora dionne esperbamos o dionne le 

dijimos.











AUGUSTIN BISHOP PA-C Mar 24, 2017 20:25 ambulatory

## 2018-01-04 NOTE — ED STATDOCS - PROGRESS NOTE
Patient Information     Patient Name MRN Sex Moses Ott [id number] Male 1949      Progress Notes by Jorge Barnett MD at 3/30/2017  4:10 PM     Author:  Jorge Barnett MD Service:  (none) Author Type:  Physician     Filed:  3/30/2017  7:19 PM Encounter Date:  3/30/2017 Status:  Signed     :  Jorge Barnett MD (Physician)            Nursing Notes:   Barbara Connor ERNST  3/30/2017  4:44 PM  Signed  Patient presents to the clinic for medication management.  Patient express concerns about still having a cough since diagnosed with Influenza A.    Barbara Connor, LPN        3/30/2017 4:30 PM    Moses Whittaker presents to clinic today for:   Chief Complaint    Patient presents with      Cough     Medication Management     HPI: Mr. Whittaker is a 68 y.o. male who presents today for evaluation of above.     (E11.22,  E11.65,  N18.3) Uncontrolled type 2 diabetes mellitus with stage 3 chronic kidney disease, without long-term current use of insulin (HC)  (primary encounter diagnosis)  (R05) Persistent cough for 3 weeks or longer  (G47.419) NARCOLEPSY  (Z79.899) Pain medication agreement - 2015  (E78.2) Mixed hyperlipidemia  (I10) Hypertension  (E11.9,  Z79.4) Controlled type 2 diabetes mellitus with insulin therapy (HC)     Last diabetes follow-up appointment, patient's blood sugars were controlled.     -- Labs today show diabetes is now uncontrolled.  Hemoglobin A1c has gone way up.  Discussed need for tighter glucose management.  Needs to stop having sweets throughout the day.  Advised to try being more active after meals.    Recently recovered from influenza.  States his cough has been ongoing now for almost a month.  Advised that cough, post influenza, can last anywhere from 2 or 3 months for some people.  He is otherwise feeling pretty good.  Has minimal phlegm at times.  Rarely has any wheezing.  We talked about possible inhalers versus other treatment options, Mucinex.  He would like to  get a prescription for Atrovent again.  This is quite helpful from a number of years ago.  He is also to start some Mucinex.    Narcolepsy, chronic issue.  Doing well with his dextroamphetamine and Ritalin.  Has gone stable dosing for number of years.  He intermittently will go on a drug holiday and skip a day.  He will also sometimes take half dosing so that the medications continue to work -- especially on days he is not driving anywhere.  Prior to being diagnosed with narcolepsy he was fine asleep at the wheel.    Proper medication use and misuse reviewed.  Chemstrips are reviewed.  No abnormal findings noted.  Prescriptions refilled ×3 months.  The prescriptions in his medication profile for dextroamphetamine and Ritalin are actually 3 month prescription Rx -- this was initially overlooked and 3 of these were printed for each medication - total of 6 prescriptions printed, 4 of them were VOIDED -- and sent to be scanned into patient's chart.    -- Patient will need refills in 3 months.    Hyperlipidemia, currently taking Crestor 10 mg twice daily.  If he takes 20 mg that once he gets very lethargic and feels like he is in a fog.  Taking the 10 mg tablet twice daily allows him to function quite well and he has been tolerating it this way for many months.    Hypertension, currently well controlled.  Tolerating medications.  Check labs today.  Medications refilled.    Diabetes, checks his blood sugars 10 times daily.  He has insulin pump at this time.  Morning blood sugars run anywhere from .  He has a low of 67 recently and a high of 190.  Throughout the rest of the day and into the evening typically running anywhere from 80 to 130s.  HEMOGLOBIN A1C MONITORING (POCT)    Date Value   03/30/2017 8.4 % (H)     Mr. Whittaker's Body mass index is 30.26 kg/(m^2). This is out of the normal range for a 68 y.o. Normal range for ages 18-64 is between 18.5 and 24.9; normal range for ages 65+ is 23-30. To lose weight we  reviewed risks and benefits of appropriate options such as diet, exercise, and medications. Patient's strategy will be  none; patient is not ready to act   BP Readings from Last 1 Encounters:03/30/17 : 134/76  Mr. Stokes blood pressure is out of the normal range for adults. Per JNC-8 guidelines normal adult blood pressure is < 120/80, pre-hypertensive is between 120/80 and 139/89, and hypertension is 140/90 or greater. Risks of hypertension were discussed. Patient's strategy will be weight loss, increased activity and reduced salt intake    Functional Capacity: > 4 METS.   Reports that he can climb a flight of stairs without any chest pain/heaviness or shortness of breath.   Patient reports no current symptoms of fevers, chills, nausea/vomiting.   No shortness of breath.   No change in bowel/bladder habits. No melena, hematochezia. No Hematuria.   No rashes. No palpitations.  No orthopnea/paroxysmal nocturnal dyspnea   No vision or hearing issues.   No significant mood issues   No bruising.     [place]:  No flowsheet data found.    PHQ9:  PHQ Depression Screening 2/3/2017 3/30/2017   Date of PHQ exam (doc flow) 2/3/2017 3/30/2017   1. Lack of interest/pleasure 0 - Not at all 0 - Not at all   2. Feeling down/depressed 0 - Not at all 0 - Not at all   PHQ-2 TOTAL SCORE 0 0   3. Trouble sleeping 0 - Not at all 0 - Not at all   4. Decreased energy 0 - Not at all 0 - Not at all   5. Appetite change 0 - Not at all 0 - Not at all   6. Feelings of failure 0 - Not at all 0 - Not at all   7. Trouble concentrating 0 - Not at all 0 - Not at all   8. Activity level 0 - Not at all 0 - Not at all   9. Hurting yourself 0 - Not at all 0 - Not at all   PHQ-9 TOTAL SCORE 0 0   PHQ-9 Severity Level none none   Functional Impairment not difficult at all not difficult at all        I have personally reviewed the past medical history, past surgical history, medications, allergies, family and social history as listed below, on  3/30/2017.    Patient Active Problem List       Diagnosis  Date Noted     Uncontrolled type 2 diabetes mellitus with stage 3 chronic kidney disease, without long-term current use of insulin (HC)  03/30/2017     Erectile dysfunction  09/26/2016     Trigger point with neck pain  07/14/2016     Trigger point with back pain  07/14/2016     Thrombophlebitis of superficial veins of left lower extremity - 6/17/2016 06/17/2016     Controlled type 2 diabetes mellitus with insulin therapy (HC)  03/10/2016     Insulin pump in place  03/10/2016     Pain medication agreement - 12/17/2015 12/17/2015     Trigger point of extremity  12/17/2015     Myofacial muscle pain  12/17/2015     Greater trochanteric bursitis of left hip  06/24/2015     Blister of toe of right foot  12/09/2014     Cervical stenosis of spinal canal  06/17/2014     Facet arthritis of cervical region (HC)  06/17/2014     Degenerative disc disease, cervical  06/17/2014     Left arm numbness  06/05/2014     Cervical radicular pain  06/05/2014     Left atrial enlargement - Moderate - 1/20/2014 ECHO  01/23/2014     S/P coronary artery stent placement  01/10/2014     Old inferior wall myocardial infarction  01/10/2014     S/P CABG x 2  01/10/2014     Hypertension  01/10/2014     Platelet inhibition due to Plavix -- On Plavix (Brand Name due to generic intolerance) For Life.   01/10/2014     Myalgia  01/10/2014     CKD (chronic kidney disease) stage 3, GFR 30-59 ml/min  01/10/2014     Chronic diastolic heart failure - Mild - 1/20/2014 ECHO - EF 65%  01/10/2014     1/20/2014 ECHO FINAL IMPRESSION:   1. Normal left ventricular size and systolic function. Estimated ejection fraction 65%.   2. Mild increase in wall thickness of the ventricular septum with hypokinesis of the basilar segment of the ventricular septum and inferior wall.   3. Mild to moderate left atrial enlargement.   4. Trace tricuspid regurgitation.   5. Mild left ventricular diastolic dysfunction.          ACP (advance care planning)  12/05/2013     Diabetic neuropathy, painful (HC)  04/22/2013     G E R D  05/17/2012     OBESITY  05/17/2012     NARCOLEPSY       Total dose recommended by pulmonology he is dextro- amphetamine 40 mg plus   methylphenidate 40 mg in divided doses per day.  Total of 80 mg of short   acting amphetamines daily.          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       Angioplasty   10/01     Angioplasty with 2 vessel CABG the following week from the lt internal mammary to the lt anterior descending and right radial free graft       Carpal tunnel release   11/15/01     Right carpal tunnel release by Dr. Mace       Angioplasty   04/2003     Ir angiogram femoral/extremity (ia)   09/03     Unremarkable angi[facility]       Carpal tunnel release   01/2004     Left carpal tunnel release        Ptca   10/05/2004     Angioplasty        Ptca   02/2005      Angioplasty with stent.         Ptca   03/02/2005     Angioplasty with stent.        Ptca   09/23/2005     Angioplasty without stent       Rotator cuff repair   02/06/2006     Left rotator cuff repair and bone spur removal by Dr. Giraldo in [place]       Ir angiogram femoral/extremity (ia)   2/26/2007     Unremarkable coronary angiogram at [facility].       Appendectomy open   1967     Current Outpatient Prescriptions       Medication  Sig Dispense Refill     alcohol swabs (ALCOHOL PREP PADS) For home use. 1 box 0     aspirin 325 mg tablet Take  by mouth.       blood sugar diagnostic (ONE TOUCH ULTRA TEST) strip Dispense test strips covered by the patient insurance. Test 10 times per day. 900 Each 3     Blood-Glucose Meter (ACCU-CHEK ABBI PLUS METER) Dispense glucose meter, test strips and lancets covered by the patient insurance. Test 10 times per day. 1 Device 0     clopidogrel (PLAVIX) 75 mg tablet Take 1 tablet by mouth once daily. 90 tablet 4     codeine-guaiFENesin (ROBITUSSIN AC)  mg/5 mL liquid Take 10 mL by mouth every 4 hours if needed for Cough. Max dose 60 mL per 24 hrs. 200 mL 0     desoximetasone 0.25% topical (TOPICORT) 0.25 % cream Apply twice daily 180 g 1     Dextroamphetamine Sulfate (DEXTROSTAT) 10 mg tablet Take 1 tablet by mouth four times daily - for on or after 05/25/17 360 tablet 0     Dextromethorphan-guaFENesin (MUCINEX DM)  mg tablet Take 1 tablet by mouth 2 times daily if needed for Cough. - OTC / Generic Okay 60 tablet 1     diclofenac (VOLTAREN) 75 mg delayed-release tablet TAKE 1 TABLET BY MOUTH FOUR TIMES DAILY 360 tablet 3     fexofenadine (ALLEGRA ALLERGY) 60 mg tablet Take 1 tablet by mouth 2 times daily. AS NEEDED for Allergy symptoms 60 tablet 3     fish oil-omega-3 fatty acids (FISH OIL) 1,000-340 mg capsule Take  by mouth.       flaxseed oil 1,000 mg cap Take  by mouth.       furosemide (LASIX) 40 mg tablet TAKE 2 TO 4 TABLETS BY MOUTH DAILY OR AS INSTRUCTED FOR LEG  "SWELLING 360 tablet 4     HUMALOG 100 unit/mL injection INJECT UNDER THE SKIN USING INSULIN PUMP. MAX DOSE  UNITS DAILY. 180 mL 2     hydrALAZINE (APRESOLINE) 25 mg tablet Take 1-2 tablets by mouth 4 times daily. - Take lowest effective dose for blood pressure management 720 tablet 3     HYDROcodone-acetaminophen, 5-325 mg, (NORCO) per tablet Take 1 tablet by mouth every 6 hours if needed for Pain. - for on or after 06/07/16 60 tablet 0     Insulin Needles, Disposable, (BD INSULIN PEN NEEDLE UF) 29 x 1/2 \" For administering insulin at home. 600 Each 2     ipratropium (ATROVENT HFA) 17 mcg/actuation inhaler Inhale 2 Puffs by mouth 4 times daily. 1 Inhaler 0     IV Administration Set (INFUSION SET) iset As directed.  0     lancets (ONE TOUCH ULTRASOFT LANCETS) Test 10 times per day. 600 Each 6     levothyroxine (SYNTHROID) 125 mcg tablet Take 1 tablet by mouth every morning. 90 tablet 4     methylphenidate (RITALIN) 10 mg tablet One tablet by mouth four times daily - for on or after 05/25/17 360 tablet 0     metoprolol succinate (TOPROL XL) 50 mg sustained-release tablet Take 1 tablet by mouth 2 times daily. 180 tablet 3     nitroglycerin (NITROSTAT) 0.4 mg sublingual tablet Place 1 tablet under the tongue every 5 minutes if needed for Chest Pain. 3 Bottle 1     ranitidine (ZANTAC) 150 mg tablet Take 1 tablet by mouth 2 times daily. 180 tablet 3     ranolazine (RANEXA) 500 mg Controlled-Release tablet Take 2 tablets by mouth 2 times daily. -- cancel other Rx -- give 3 month supply 360 tablet 3     rosuvastatin (CRESTOR) 10 mg tablet TAKE 1 TABLET BY MOUTH TWICE DAILY 180 tablet 3     sodium chloride-aloe vera (SALINE NASAL, ALOE VERA,) nasal gel Inhale  in the nostril(s) every hour if needed for Nasal Dryness. 14.1 g 3     Sub-Q Infusion Pump Access (ACCU-CHEK SPIRIT CARTRIDGE SYS) mis As directed.  0     valsartan (DIOVAN) 160 mg tablet Take 1 tablet by mouth 2 times daily. 180 tablet 3     Allergies   " "  Allergen  Reactions     Gabapentin Mental Status Change     Bextra [Valdecoxib] Edema     Lipitor [Atorvastatin] Hives     Lisinopril Cough     Lyrica [Pregabalin] Mental Status Change     Novolog [Insulin Aspart] Rash     Family History       Problem   Relation Age of Onset     Heart Disease  Mother      Heart problems       Heart Disease  Other      Heart problems       Heart Disease  Other      Heart problems       Other  Son      Ankylosing spondylitis        Other  Brother       with sudden death following shoulder surgery 2012 -- was off his Plavix for 10 days pre-operatively.       Family Status     Relation  Status     Brother      with sudden death following shoulder surgery 2012 -- was off his Plavix for 10 days pre-operatively.      Son Alive     Mother      Other      Other      Son      Brother      Social History     Social History        Marital status:       Spouse name: N/A     Number of children:  N/A     Years of education:  N/A     Social History Main Topics       Smoking status: Never Smoker     Smokeless tobacco: Never Used     Alcohol use No     Drug use: No     Sexual activity: Yes     Other Topics  Concern     Not on file      Social History Narrative     He is on Social Security Disability for coronary artery disease and cervical arthritis and disk disease.   Dax obed.  No tobacco.             Pertinent ROS was performed and was negative as noted in HPI above.     EXAM:   Vitals:     17 1631   BP: 134/76   Pulse: 72   Temp: 99  F (37.2  C)   TempSrc: Tympanic   SpO2: 97%   Weight: 93.6 kg (206 lb 6 oz)   Height: 1.759 m (5' 9.25\")     BP Readings from Last 3 Encounters:    17 134/76   17 154/66   16 134/78     Wt Readings from Last 3 Encounters:    17 93.6 kg (206 lb 6 oz)   17 94.3 kg (208 lb)   17 94.8 kg (209 lb)     Estimated body mass index is 30.26 kg/(m^2) as calculated from the following:    Height as " "of this encounter: 1.759 m (5' 9.25\").    Weight as of this encounter: 93.6 kg (206 lb 6 oz).     EXAM:  Constitutional: Pleasant, alert, appropriate appearance for age. No acute distress  ENT: Normocephalic, Atraumatic, Thyroid without nodules or tenderness   Nose/Mouth: Oral pharynx without erythema or exudates and Nose is patent bilaterally, no rhinorrhea   Eyes:  Extraocular muscles intact, Sclera non-icteric, Conjunctiva without erythema  Lymphatic Exam: Non-palpable nodes in neck, clavicular regions  Pulmonary: Lungs are clear to auscultation bilaterally, without wheezes or crackles  Cardiovascular Exam: regular rate and rhythm, no pedal edema, no murmur, click, rub or gallop appreciated  Gastrointestinal Exam: Obese  Integument: No abnormal rashes, sores, or ulcerations noted  Neurologic Exam: CN 2-12 grossly intact Nonfocal; symmetric DTRs, normal gross motor movement, tone, and coordination. No tremor.  Musculoskeletal Exam: Gait and station appear grossly normal  Psychiatric Exam: Awake and Alert, Affect and mood appropriate  Speech is fluent, Thought process is normal    INVESTIGATIONS:  Results for orders placed or performed in visit on 03/30/17      CBC W PLT NO DIFF      Result  Value Ref Range    WHITE BLOOD COUNT         7.1 4.5 - 11.0 thou/cu mm    RED BLOOD COUNT           4.69 4.30 - 5.90 mil/cu mm    HEMOGLOBIN                15.5 13.5 - 17.5 g/dL    HEMATOCRIT                46.4 37.0 - 53.0 %    MCV                       99 80 - 100 fL    MCH                       33.1 26.0 - 34.0 pg    MCHC                      33.4 32.0 - 36.0 g/dL    RDW                       12.6 11.5 - 15.5 %    PLATELET COUNT            260 140 - 440 thou/cu mm    MPV                       6.0 (L) 6.5 - 11.0 fL   COMP METABOLIC PANEL      Result  Value Ref Range    SODIUM 139 133 - 143 mmol/L    POTASSIUM 4.3 3.5 - 5.1 mmol/L    CHLORIDE 102 98 - 107 mmol/L    CO2,TOTAL 25 21 - 31 mmol/L    ANION GAP 12 5 - 18             "        GLUCOSE 110 (H) 70 - 105 mg/dL    CALCIUM 9.6 8.6 - 10.3 mg/dL    BUN 30 (H) 7 - 25 mg/dL    CREATININE 1.85 (H) 0.70 - 1.30 mg/dL    BUN/CREAT RATIO           16                    GFR if African American 45 (L) >60 ml/min/1.73m2    GFR if not African American 37 (L) >60 ml/min/1.73m2    ALBUMIN 4.6 3.5 - 5.7 g/dL    PROTEIN,TOTAL 7.6 6.4 - 8.9 g/dL    GLOBULIN                  3.0 2.0 - 3.7 g/dL    A/G RATIO 1.5 1.0 - 2.0                    BILIRUBIN,TOTAL 0.7 0.3 - 1.0 mg/dL    ALK PHOSPHATASE 61 34 - 104 IU/L    ALT (SGPT) 32 7 - 52 IU/L    AST (SGOT) 26 13 - 39 IU/L   HEMOGLOBIN A1C MONITORING (POCT)      Result  Value Ref Range    HEMOGLOBIN A1C MONITORING (POCT) 8.4 (H) 4.0 - 6.2 %    ESTIMATED AVERAGE GLUCOSE  194 mg/dL   LIPID PANEL      Result  Value Ref Range    CHOLESTEROL,TOTAL 100 <200 mg/dL    TRIGLYCERIDES 141 <150 mg/dL    HDL CHOLESTEROL 36 23 - 92 mg/dL    NON-HDL CHOLESTEROL 64 <145 mg/dl    CHOL/HDL RATIO            2.78 <4.50                    LDL CHOLESTEROL 36 <100 mg/dL    PATIENT STATUS            NOT GIVEN                       ASSESSMENT AND PLAN:  Moses was seen today for cough and medication management.    Diagnoses and all orders for this visit:    Uncontrolled type 2 diabetes mellitus with stage 3 chronic kidney disease, without long-term current use of insulin (HC)    Persistent cough for 3 weeks or longer  -     Dextromethorphan-guaFENesin (MUCINEX DM)  mg tablet; Take 1 tablet by mouth 2 times daily if needed for Cough. - OTC / Generic Okay  -     ipratropium (ATROVENT HFA) 17 mcg/actuation inhaler; Inhale 2 Puffs by mouth 4 times daily.    NARCOLEPSY  -     Discontinue: Dextroamphetamine Sulfate (DEXTROSTAT) 10 mg tablet; Take 1 tablet by mouth four times daily - for on or after 3/30/2017  -     Discontinue: methylphenidate (RITALIN) 10 mg tablet; One tablet by mouth four times daily - for on or after 3/30/2017  -     Discontinue: methylphenidate (RITALIN) 10 mg  tablet; One tablet by mouth four times daily - for on or after 04/27/17  -     Discontinue: Dextroamphetamine Sulfate (DEXTROSTAT) 10 mg tablet; Take 1 tablet by mouth four times daily - for on or after 04/27/17  -     methylphenidate (RITALIN) 10 mg tablet; One tablet by mouth four times daily - for on or after 05/25/17  -     Dextroamphetamine Sulfate (DEXTROSTAT) 10 mg tablet; Take 1 tablet by mouth four times daily - for on or after 05/25/17    Pain medication agreement - 12/17/2015  -     Discontinue: Dextroamphetamine Sulfate (DEXTROSTAT) 10 mg tablet; Take 1 tablet by mouth four times daily - for on or after 3/30/2017  -     Discontinue: methylphenidate (RITALIN) 10 mg tablet; One tablet by mouth four times daily - for on or after 3/30/2017  -     Discontinue: methylphenidate (RITALIN) 10 mg tablet; One tablet by mouth four times daily - for on or after 04/27/17  -     Discontinue: Dextroamphetamine Sulfate (DEXTROSTAT) 10 mg tablet; Take 1 tablet by mouth four times daily - for on or after 04/27/17  -     methylphenidate (RITALIN) 10 mg tablet; One tablet by mouth four times daily - for on or after 05/25/17  -     Dextroamphetamine Sulfate (DEXTROSTAT) 10 mg tablet; Take 1 tablet by mouth four times daily - for on or after 05/25/17    Mixed hyperlipidemia  -     rosuvastatin (CRESTOR) 10 mg tablet; TAKE 1 TABLET BY MOUTH TWICE DAILY  -     LIPID PANEL    Hypertension  -     CBC W PLT NO DIFF  -     COMP METABOLIC PANEL    Controlled type 2 diabetes mellitus with insulin therapy (HC)  -     HEMOGLOBIN A1C MONITORING (POCT)      lab results and schedule of future lab studies reviewed with patient, reviewed diet, exercise and weight control, recommended sodium restriction, cardiovascular risk and specific lipid/LDL goals reviewed, specific diabetic recommendations referral to Diabetic Education department, low cholesterol diet, weight control and daily exercise discussed, home glucose monitoring emphasized,  foot care discussed and Podiatry visits discussed, annual eye examinations at Ophthalmology discussed, glycohemoglobin and other lab monitoring discussed and long term diabetic complications discussed    -- Expected clinical course discussed   -- Medications and their side effects discussed    45 minutes spent in face-to-face interaction with patient (separate from separately billed procedures) with greater than 50% spent in counseling and care coordination of listed medical problems.      The ASCVD Risk score (Samantharoxanne GUZMAN Jr., et al., 2013) failed to calculate for the following reasons:    The patient has a prior MCI or stroke diagnosis    Moses is also recommended to eat a heart-healthy diet, do regular aerobic exercises, maintain a desirable body weight, and avoid tobacco products. These recommendations are from the American Heart Association (AHA) which stresses the importance of lifestyle changes to lower cardiovascular disease risk.     Return in about 3 months (around 6/30/2017) for -- controlled med refills  -- labs in 91+ days with Diabetes clinic appointment with Dr. Barnett 1-2.    Patient Instructions     1. Persistent cough for 3 weeks or longer  - Dextromethorphan-guaFENesin (MUCINEX DM)  mg tablet; Take 1 tablet by mouth 2 times daily if needed for Cough. - OTC / Generic Okay  Dispense: 60 tablet; Refill: 1    2. NARCOLEPSY  - methylphenidate (RITALIN) 10 mg tablet; One tablet by mouth four times daily - for on or after 05/25/17  Dispense: 360 tablet; Refill: 0  - Dextroamphetamine Sulfate (DEXTROSTAT) 10 mg tablet; Take 1 tablet by mouth four times daily - for on or after 05/25/17  Dispense: 360 tablet; Refill: 0    3. Pain medication agreement - 12/17/2015  - methylphenidate (RITALIN) 10 mg tablet; One tablet by mouth four times daily - for on or after 05/25/17  Dispense: 360 tablet; Refill: 0  - Dextroamphetamine Sulfate (DEXTROSTAT) 10 mg tablet; Take 1 tablet by mouth four times daily - for  on or after 05/25/17  Dispense: 360 tablet; Refill: 0    4. Mixed hyperlipidemia  - rosuvastatin (CRESTOR) 10 mg tablet; TAKE 1 TABLET BY MOUTH TWICE DAILY  Dispense: 180 tablet; Refill: 3      Use Atrovent inhaler if needed for cough.       Return in 2.5 to 3 months for Med Management appointment and medication refills (BEFORE YOU RUN OUT OF Controlled Medications), or sooner as needed for follow-up with Dr. Barnett.     Clinic : [phone]  Appointment line: [phone]          Return for Diabetes labs and clinic follow-up appointment every 3 to 4 months.  --- (Go for about 91 to 100 days)    Schedule lab only appointment --- A few days AFTER: 06/28/17     Schedule clinic appointment with Dr. Barnett -- Same day as labs, or 1-2 days later.     Insurance companies are now grading you and I on your blood sugar control -- Goal is to get your A1c down to 7.9% or lower and NO Smoking!    -- Medicare and most insurance companies, will only cover Hemoglobin A1c labs to be rechecked every 91+ days.      HEMOGLOBIN A1C MONITORING (POCT)    Date Value   10/19/2016 7.4 % (H)   04/02/2013 7.4 % NGSP (H)     HEMOGLOBIN A1C GIH (%)    Date Value   07/24/2012 8.0 (H)        Next follow-up appointment with Dr. Barnett should be scheduled:  -- Approximately a few days AFTER: 06/28/17      Jorge Barnett MD         Stable.

## 2019-01-23 NOTE — DISCHARGE NOTE ADULT - NS AS DC FOLLOWUP INST YN
Noted and reported by family  Mentation precludes counseling at this time  Will address when patient is more appropriate   Yes
